# Patient Record
Sex: MALE | Race: WHITE | NOT HISPANIC OR LATINO | URBAN - NONMETROPOLITAN AREA
[De-identification: names, ages, dates, MRNs, and addresses within clinical notes are randomized per-mention and may not be internally consistent; named-entity substitution may affect disease eponyms.]

---

## 2018-04-23 PROBLEM — H40.1132: Noted: 2018-04-23

## 2018-04-23 PROBLEM — H26.493: Noted: 2018-04-23

## 2019-03-12 ENCOUNTER — IMPORTED ENCOUNTER (OUTPATIENT)
Dept: URBAN - NONMETROPOLITAN AREA CLINIC 1 | Facility: CLINIC | Age: 79
End: 2019-03-12

## 2019-03-12 PROCEDURE — 92014 COMPRE OPH EXAM EST PT 1/>: CPT

## 2019-03-12 NOTE — PATIENT DISCUSSION
COAG-Appears stable. - IOP's today were 14 OU -Continue Latanoprost OU QHS. - Reviewed VF from 12/19/18 with pt - Dre Jacobs 72 OCT done 9/10/18 stable - Orderd VF-Ordered Disc Photos RTC 4 mo DFE and Photos s/p PCIOL-Stable PCIOL OU.-Monitor for PCO. 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 -Explained symptoms of advancing PCO. -Continue to monitor for now. Pt will notify us if any new symptoms develop. Ptosis/splitting pupil. Discussed with pt. Sx not recommended at this time. NATASHA- Continue Systane OU PRN. ; 's Notes: DFE- 4/23/17ONH OCT- 9/10/18VF- 12/19/18

## 2019-07-16 ENCOUNTER — IMPORTED ENCOUNTER (OUTPATIENT)
Dept: URBAN - NONMETROPOLITAN AREA CLINIC 1 | Facility: CLINIC | Age: 79
End: 2019-07-16

## 2019-07-16 PROCEDURE — 92083 EXTENDED VISUAL FIELD XM: CPT

## 2019-07-16 NOTE — PATIENT DISCUSSION
COAG-Appears stable. - IOP's today were 14 OU -Continue Latanoprost OU QHS. 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 -Explained symptoms of advancing PCO. -Continue to monitor for now. Pt will notify us if any new symptoms develop. Ptosis/splitting pupil. Discussed with pt. Sx not recommended at this time. NATASHA- Continue Systane OU PRN. ; 's Notes: DFE- 4/23/17ONH OCT- 9/10/18VF- 12/19/18

## 2019-10-01 NOTE — PATIENT DISCUSSION
Recommended warm compresses twice daily. Maxitrol alberto Qhs OS for 2 weeks. consult with Jonah Nielson if no improvement.

## 2019-10-18 ENCOUNTER — IMPORTED ENCOUNTER (OUTPATIENT)
Dept: URBAN - NONMETROPOLITAN AREA CLINIC 1 | Facility: CLINIC | Age: 79
End: 2019-10-18

## 2019-10-18 PROCEDURE — 92014 COMPRE OPH EXAM EST PT 1/>: CPT

## 2019-10-18 NOTE — PATIENT DISCUSSION
COAG-Appears stable. - IOP's today were 14 OU -Continue Latanoprost OU QHS. - Reviewed VF from 12/19/18 with pt - Dre Jacobs 72 OCT done 9/10/18 stable s/p cvaOU: right homonymous hemianopias/p PCIOL-Stable PCIOL OU.-Monitor for PCO. 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 -Explained symptoms of advancing PCO. -Continue to monitor for now. Pt will notify us if any new symptoms develop. Ptosis/splitting pupil. Discussed with pt. Sx not recommended at this time. NATASHA- Continue Systane OU PRN. ; 's Notes: DFE- 4/23/17ONH OCT- 9/10/18VF- 12/19/18

## 2019-10-31 NOTE — PATIENT DISCUSSION
Discussed tearing is multifactorial.  Patient elects to have P&I today. Consent signed. Probing and irrigation performed by Teressa Reese PA-C. +partial return of flow out of upper punctum. Discussed potential causes of tearing to include dry eye, lower lid laxity and partial NLDO. Monitor and if flushing doesn't resolve, discussed LLL canthoplasty with JPF would be next step. Follow up PRN.

## 2019-10-31 NOTE — PROCEDURE NOTE: CLINICAL
PROCEDURE NOTE: Probing of Lacrimal Canaliculi, With or Without Irrigation Prior to treatment, the risks/benefits/alternatives were discussed. The patient wished to proceed with procedure. Patient tolerated procedure well. There were no complications. Post-op instructions given. Yusra Mackay

## 2020-06-24 ENCOUNTER — IMPORTED ENCOUNTER (OUTPATIENT)
Dept: URBAN - NONMETROPOLITAN AREA CLINIC 1 | Facility: CLINIC | Age: 80
End: 2020-06-24

## 2020-06-24 PROBLEM — H26.493: Noted: 2020-06-24

## 2020-06-24 PROBLEM — Z96.1: Noted: 2020-06-24

## 2020-06-24 PROBLEM — H40.1132: Noted: 2018-04-23

## 2020-06-24 PROCEDURE — 92083 EXTENDED VISUAL FIELD XM: CPT

## 2020-06-24 PROCEDURE — 92012 INTRM OPH EXAM EST PATIENT: CPT

## 2020-06-24 NOTE — PATIENT DISCUSSION
COAG-Appears stable. - IOP's today were stable ou-Continue Latanoprost OU QHS. s/p cvaOU: right homonymous hemianopias/p PCIOL-Stable PCIOL OU.-Monitor for PCO. 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 -Explained symptoms of advancing PCO. -Continue to monitor for now. Pt will notify us if any new symptoms develop. Ptosis/splitting pupil. Discussed with pt. Sx not recommended at this time. NATASHA- Continue Systane OU PRN. ; 's Notes: DFE- 4/23/17ONH OCT- 9/10/18VF- 12/19/18

## 2020-10-05 NOTE — PATIENT DISCUSSION
Discussed tearing is multifactorial.  Patient elects to have P&I today. Consent signed. Probing and irrigation performed by Kair Lawson PA-C. +partial return of flow out of upper punctum. Discussed potential causes of tearing to include dry eye, lower lid laxity and partial NLDO. Monitor and if flushing doesn't resolve, discussed LLL canthoplasty with JPF would be next step. Follow up PRN.

## 2021-01-13 ENCOUNTER — IMPORTED ENCOUNTER (OUTPATIENT)
Dept: URBAN - NONMETROPOLITAN AREA CLINIC 1 | Facility: CLINIC | Age: 81
End: 2021-01-13

## 2021-01-13 PROCEDURE — 92014 COMPRE OPH EXAM EST PT 1/>: CPT

## 2021-01-13 NOTE — PATIENT DISCUSSION
COAG-Appears stable. - IOP's today were stable ou-Continue Latanoprost OU QHS. -oct today/ stable ous/p cvaOU: right homonymous hemianopias/p PCIOL-Stable PCIOL OU.-Monitor for PCO. 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 -Explained symptoms of advancing PCO. -Continue to monitor for now. Pt will notify us if any new symptoms develop. Ptosis/splitting pupil. Discussed with pt. Sx not recommended at this time. NATASHA- Continue Systane OU PRN. ; 's Notes: DFE- 4/23/17ONH OCT- 9/10/18VF- 12/19/18

## 2021-07-15 ENCOUNTER — IMPORTED ENCOUNTER (OUTPATIENT)
Dept: URBAN - NONMETROPOLITAN AREA CLINIC 1 | Facility: CLINIC | Age: 81
End: 2021-07-15

## 2021-07-15 PROCEDURE — 99213 OFFICE O/P EST LOW 20 MIN: CPT

## 2021-07-15 PROCEDURE — 92083 EXTENDED VISUAL FIELD XM: CPT

## 2021-07-15 NOTE — PATIENT DISCUSSION
COAG-Appears stable. - IOP's today were stable ou-Continue Latanoprost OU QHS. -vf today/ stable ou-monitor for iop and vf changess/p cvaOU: right homonymous hemianopias/p PCIOL-Stable PCIOL OU.-Monitor for PCO. 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 -Explained symptoms of advancing PCO. -Continue to monitor for now. Pt will notify us if any new symptoms develop. Ptosis/splitting pupil. Discussed with pt. Sx not recommended at this time. NATASHA- Continue Systane OU PRN. ; 's Notes: DFE- 4/23/17ONH OCT- 9/10/18VF- 12/19/18

## 2021-10-11 NOTE — PATIENT DISCUSSION
Date:  7/17/2017    Medication:  Aripiprazole 5 mg tabs    [] Patient completely out of medication    Message to Prescriber:     Pharmacy Name:  [facility]    Pharmacy Phone Number:  [phone]    Pharmacy Fax Number:  [phone]    Last Appointment:  2/20/17    Next Appointment:  Pending NOS 5/16/17      If no future appointment scheduled, was patient contacted?  Yes  [x]    No  []       Outcome:  [x]  Left message     []  Phone disconnected     []  Voicemail box full          []  Spoke to patient   The patient was here for a vision care examination. There were no untoward findings noted. Repeat evaluation in one to two years is indicated.

## 2021-10-11 NOTE — PATIENT DISCUSSION
Discussed tearing is multifactorial.  Patient elects to have P&I today. Consent signed. Probing and irrigation performed by Darryn Mitchell PA-C. +partial return of flow out of upper punctum. Discussed potential causes of tearing to include dry eye, lower lid laxity and partial NLDO. Monitor and if flushing doesn't resolve, discussed LLL canthoplasty with JPF would be next step. Follow up PRN.

## 2021-10-11 NOTE — PATIENT DISCUSSION
Began on Lg qhs OU. Disc RNFL thinning and ONH is showing more cupping. Disc needs tx. IOP check in 1 month.

## 2022-01-13 ENCOUNTER — IMPORTED ENCOUNTER (OUTPATIENT)
Dept: URBAN - NONMETROPOLITAN AREA CLINIC 1 | Facility: CLINIC | Age: 82
End: 2022-01-13

## 2022-01-13 PROCEDURE — 92014 COMPRE OPH EXAM EST PT 1/>: CPT

## 2022-01-13 PROCEDURE — 92133 CPTRZD OPH DX IMG PST SGM ON: CPT

## 2022-01-13 NOTE — PATIENT DISCUSSION
COAG-Appears stable. - IOP's today were stable ou-Continue Latanoprost OU QHS. -oct today/ stable ou-monitor for iop and vf changess/p cvaOU: right homonymous hemianopias/p PCIOL-Stable PCIOL OU.-Monitor for PCO. 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 -Explained symptoms of advancing PCO. -Continue to monitor for now. Pt will notify us if any new symptoms develop. Ptosis/splitting pupil. Discussed with pt. Sx not recommended at this time. NATASHA- Continue Systane OU PRN. ; 's Notes: DFE- 4/23/17ONH OCT- 9/10/18VF- 12/19/18

## 2022-01-17 NOTE — PATIENT DISCUSSION
DISC USING LAT QHS OU MORE REGULARLY. VF APPEARS FAIRLY CLEAR TODAY AND IOP SEEMS ADEQUATE. GOAL 16MMHG.

## 2022-01-17 NOTE — PATIENT DISCUSSION
Discussed tearing is multifactorial.  Patient elects to have P&I today. Consent signed. Probing and irrigation performed by More Mayer PA-C. +partial return of flow out of upper punctum. Discussed potential causes of tearing to include dry eye, lower lid laxity and partial NLDO. Monitor and if flushing doesn't resolve, discussed LLL canthoplasty with JPF would be next step. Follow up PRN.

## 2022-04-09 ASSESSMENT — TONOMETRY
OS_IOP_MMHG: 15
OD_IOP_MMHG: 16
OS_IOP_MMHG: 16
OD_IOP_MMHG: 14
OD_IOP_MMHG: 15
OS_IOP_MMHG: 15
OS_IOP_MMHG: 14
OD_IOP_MMHG: 15
OS_IOP_MMHG: 14
OS_IOP_MMHG: 16
OD_IOP_MMHG: 16
OD_IOP_MMHG: 14

## 2022-04-09 ASSESSMENT — PACHYMETRY
OD_CT_UM: 527; ADJ: THIN
OS_CT_UM: 530; ADJ: THIN

## 2022-04-09 ASSESSMENT — VISUAL ACUITY
OS_CC: 20/50
OU_CC: 20/40-1
OS_CC: J1
OS_SC: 20/70
OS_PH: 20/40-
OU_CC: 20/40
OD_SC: 20/80-2
OD_CC: 20/70-1
OD_CC: 20/50-1
OD_PH: 20/40
OS_CC: 20/40-
OS_CC: 20/50-2
OD_SC: 20/70
OD_PH: 20/40-1
OS_SC: J1
OD_SC: J1
OD_CC: 20/50
OS_SC: 20/40-2
OD_CC: 20/60
OD_CC: J1
OS_CC: 20/50
OS_CC: 20/40
OD_CC: 20/60

## 2022-06-17 NOTE — PATIENT DISCUSSION
EB[facility]: DISC USING LAT QHS OU MORE REGULARLY. VF APPEARS FAIRLY CLEAR TODAY AND IOP SEEMS ADEQUATE. GOAL 16MMHG.

## 2022-06-17 NOTE — PATIENT DISCUSSION
Discussed tearing is multifactorial.  Patient elects to have P&I today. Consent signed. Probing and irrigation performed by Kesha Tamayo PA-C. +partial return of flow out of upper punctum. Discussed potential causes of tearing to include dry eye, lower lid laxity and partial NLDO. Monitor and if flushing doesn't resolve, discussed LLL canthoplasty with JPF would be next step. Follow up PRN.

## 2022-07-15 NOTE — PATIENT DISCUSSION
Despite some risk factors, the patient does not demonstrate definitive evidence of glaucoma at this time.
Discussed condition and exacerbating conditions/situations (e.g., dry/arid environments, overhead fans, air conditioners, side effect of medications).
Discussed importance of compliance with ocular medications and follow up exams to prevent loss of vision.
Discussed lid hygiene, warm compress and eyelid wash.
Discussed tearing is multifactorial.  Patient elects to have P&I today. Consent signed. Probing and irrigation performed by Myriam Schaumann, PA-C. +partial return of flow out of upper punctum. Discussed potential causes of tearing to include dry eye, lower lid laxity and partial NLDO. Monitor and if flushing doesn't resolve, discussed LLL canthoplasty with JPF would be next step. Follow up PRN.
Glasses Prescription given to patient.
If chalazion worsens, patient advised to return to clinic.
MOXIFLOXACIN BID OU FOR 10 DAYS THEN STOP.
Monitor for now.
Monitor.
No Retinal holes, Tears or Detachments.
Patient achieved a 15% reduction in IOP from pretreatment levels or a plan is in place to achieve this goal.
Patient made aware of 24/7 emergency services.
Patient understands condition, prognosis and need for follow up care.
Recommended artificial tears to use: 1 drop 4x a day in both eyes.
Recommended lid scrubs.
Recommended warm compresses.
Recommended  the continued use of warm compresses.
Retinal tear and detachment warning symptoms reviewed and patient instructed to call immediately if increasing floaters, flashes, or decreasing peripheral vision.
Surgical vs nonsurgical treatment options were discussed.
The IOP is in the target range. The patient will continue the current management.
Will get testing  done at next appt.
maxitrol alberto BID OU x 1 week.
poss SLT change to [facility] q ou--RNFL appears to be thinning. Needs IOP to be 14mmHg or lower--Recheck in 2 weeks.
No

## 2022-07-26 NOTE — PATIENT DISCUSSION
Discussed tearing is multifactorial.  Patient elects to have P&I today. Consent signed. Probing and irrigation performed by King Torres PA-C. +partial return of flow out of upper punctum. Discussed potential causes of tearing to include dry eye, lower lid laxity and partial NLDO. Monitor and if flushing doesn't resolve, discussed LLL canthoplasty with JPF would be next step. Follow up PRN.

## 2022-07-26 NOTE — PATIENT DISCUSSION
poss SLT change to [facility] q ou--RNFL appears to be thinning. Needs IOP to be 14mmHg or lower--Recheck in 2 weeks.

## 2022-07-29 NOTE — PATIENT DISCUSSION
Discussed tearing is multifactorial.  Patient elects to have P&I today. Consent signed. Probing and irrigation performed by Aniyah Schwartz PA-C. +partial return of flow out of upper punctum. Discussed potential causes of tearing to include dry eye, lower lid laxity and partial NLDO. Monitor and if flushing doesn't resolve, discussed LLL canthoplasty with JPF would be next step. Follow up PRN.

## 2022-10-17 ENCOUNTER — ESTABLISHED PATIENT (OUTPATIENT)
Dept: RURAL CLINIC 1 | Facility: CLINIC | Age: 82
End: 2022-10-17

## 2022-10-17 DIAGNOSIS — H40.1132: ICD-10-CM

## 2022-10-17 PROCEDURE — 99213 OFFICE O/P EST LOW 20 MIN: CPT

## 2022-10-17 PROCEDURE — 92083 EXTENDED VISUAL FIELD XM: CPT

## 2022-10-17 ASSESSMENT — TONOMETRY
OD_IOP_MMHG: 15
OS_IOP_MMHG: 16

## 2022-10-17 ASSESSMENT — VISUAL ACUITY
OU_SC: 20/40
OD_PH: 20/40+2
OS_PH: 20/30
OS_SC: 20/40
OD_SC: 20/40

## 2022-11-30 NOTE — PATIENT DISCUSSION
Discussed tearing is multifactorial.  Patient elects to have P&I today. Consent signed. Probing and irrigation performed by Minerva Kumar PA-C. +partial return of flow out of upper punctum. Discussed potential causes of tearing to include dry eye, lower lid laxity and partial NLDO. Monitor and if flushing doesn't resolve, discussed LLL canthoplasty with JPF would be next step. Follow up PRN.

## 2022-11-30 NOTE — PATIENT DISCUSSION
warm compresses BID OU and gave rx for ZPAK PO after pt is off of current antibiotic for teeth. Disc chronic in nature and try hydro eye vitamins as well as lid scrubs. I-lux if persists.

## 2022-11-30 NOTE — PATIENT DISCUSSION
Ganga too expensive and change to Lat qhs OU instead, recheck IOP in 4 months and if increases poss Lg QHS OU.

## 2023-04-18 ENCOUNTER — FOLLOW UP (OUTPATIENT)
Dept: RURAL CLINIC 1 | Facility: CLINIC | Age: 83
End: 2023-04-18

## 2023-04-18 DIAGNOSIS — Z96.1: ICD-10-CM

## 2023-04-18 DIAGNOSIS — H26.493: ICD-10-CM

## 2023-04-18 DIAGNOSIS — H40.1132: ICD-10-CM

## 2023-04-18 PROCEDURE — 92133 CPTRZD OPH DX IMG PST SGM ON: CPT

## 2023-04-18 PROCEDURE — 92014 COMPRE OPH EXAM EST PT 1/>: CPT

## 2023-04-18 ASSESSMENT — VISUAL ACUITY
OU_SC: 20/30-1
OS_PH: 20/40
OD_SC: 20/40-1
OU_SC: 20/40
OS_SC: 20/40-1
OD_PH: 20/40

## 2023-04-18 ASSESSMENT — TONOMETRY
OS_IOP_MMHG: 17
OD_IOP_MMHG: 17

## 2023-08-09 NOTE — PATIENT DISCUSSION
"----- Message from Abbie Amor RN sent at 8/9/2023  9:41 AM CDT -----  Good morning,     Pt states she paid $45 for a "donut" pillow and still hasn't received it. Can someone reach out to her about this?    Thanks    " Recommended  the continued use of warm compresses.

## 2023-10-17 ENCOUNTER — FOLLOW UP (OUTPATIENT)
Dept: RURAL CLINIC 1 | Facility: CLINIC | Age: 83
End: 2023-10-17

## 2023-10-17 DIAGNOSIS — Z96.1: ICD-10-CM

## 2023-10-17 DIAGNOSIS — H26.493: ICD-10-CM

## 2023-10-17 DIAGNOSIS — H40.1132: ICD-10-CM

## 2023-10-17 PROCEDURE — 92083 EXTENDED VISUAL FIELD XM: CPT

## 2023-10-17 PROCEDURE — 99214 OFFICE O/P EST MOD 30 MIN: CPT

## 2023-10-17 ASSESSMENT — VISUAL ACUITY
OS_SC: 20/30-1
OD_SC: 20/40-1
OU_SC: 20/40-1

## 2023-10-17 ASSESSMENT — TONOMETRY
OS_IOP_MMHG: 16
OD_IOP_MMHG: 16

## 2024-04-23 ENCOUNTER — FOLLOW UP (OUTPATIENT)
Dept: RURAL CLINIC 1 | Facility: CLINIC | Age: 84
End: 2024-04-23

## 2024-04-23 DIAGNOSIS — H26.493: ICD-10-CM

## 2024-04-23 DIAGNOSIS — Z96.1: ICD-10-CM

## 2024-04-23 DIAGNOSIS — H40.1132: ICD-10-CM

## 2024-04-23 PROCEDURE — 92133 CPTRZD OPH DX IMG PST SGM ON: CPT

## 2024-04-23 PROCEDURE — 92014 COMPRE OPH EXAM EST PT 1/>: CPT

## 2024-04-23 ASSESSMENT — VISUAL ACUITY
OD_SC: 20/70
OS_SC: 20/80

## 2024-04-23 ASSESSMENT — TONOMETRY
OS_IOP_MMHG: 18
OD_IOP_MMHG: 18

## 2025-04-22 ENCOUNTER — FOLLOW UP (OUTPATIENT)
Age: 85
End: 2025-04-22

## 2025-04-22 DIAGNOSIS — H26.493: ICD-10-CM

## 2025-04-22 DIAGNOSIS — Z96.1: ICD-10-CM

## 2025-04-22 DIAGNOSIS — H40.1132: ICD-10-CM

## 2025-04-22 PROCEDURE — 99214 OFFICE O/P EST MOD 30 MIN: CPT

## 2025-04-22 PROCEDURE — 92083 EXTENDED VISUAL FIELD XM: CPT
